# Patient Record
Sex: FEMALE | Race: WHITE | HISPANIC OR LATINO | Employment: FULL TIME | URBAN - METROPOLITAN AREA
[De-identification: names, ages, dates, MRNs, and addresses within clinical notes are randomized per-mention and may not be internally consistent; named-entity substitution may affect disease eponyms.]

---

## 2017-02-03 ENCOUNTER — HOSPITAL ENCOUNTER (EMERGENCY)
Facility: HOSPITAL | Age: 32
Discharge: HOME | End: 2017-02-03
Attending: EMERGENCY MEDICINE

## 2017-02-03 VITALS
HEIGHT: 62 IN | SYSTOLIC BLOOD PRESSURE: 114 MMHG | RESPIRATION RATE: 18 BRPM | OXYGEN SATURATION: 100 % | WEIGHT: 170 LBS | HEART RATE: 84 BPM | BODY MASS INDEX: 31.28 KG/M2 | DIASTOLIC BLOOD PRESSURE: 72 MMHG | TEMPERATURE: 98 F

## 2017-02-03 DIAGNOSIS — N12 PYELONEPHRITIS: Primary | ICD-10-CM

## 2017-02-03 DIAGNOSIS — R50.9 FEVER, UNSPECIFIED FEVER CAUSE: ICD-10-CM

## 2017-02-03 LAB
B-HCG UR QL: NEGATIVE
BACTERIA #/AREA URNS HPF: ABNORMAL /HPF
BILIRUB UR QL STRIP: NEGATIVE
CLARITY UR: ABNORMAL
COLOR UR: YELLOW
CTP QC/QA: YES
FLUAV AG SPEC QL IA: NEGATIVE
FLUBV AG SPEC QL IA: NEGATIVE
GLUCOSE UR QL STRIP: NEGATIVE
HGB UR QL STRIP: ABNORMAL
KETONES UR QL STRIP: ABNORMAL
LEUKOCYTE ESTERASE UR QL STRIP: ABNORMAL
MICROSCOPIC COMMENT: ABNORMAL
NITRITE UR QL STRIP: NEGATIVE
PH UR STRIP: 6 [PH] (ref 5–8)
PROT UR QL STRIP: NEGATIVE
RBC #/AREA URNS HPF: 5 /HPF (ref 0–4)
SP GR UR STRIP: 1.01 (ref 1–1.03)
SPECIMEN SOURCE: NORMAL
SQUAMOUS #/AREA URNS HPF: 5 /HPF
URN SPEC COLLECT METH UR: ABNORMAL
UROBILINOGEN UR STRIP-ACNC: NEGATIVE EU/DL
WBC #/AREA URNS HPF: 40 /HPF (ref 0–5)
WBC CLUMPS URNS QL MICRO: ABNORMAL
YEAST URNS QL MICRO: ABNORMAL

## 2017-02-03 PROCEDURE — 87400 INFLUENZA A/B EACH AG IA: CPT | Mod: 59

## 2017-02-03 PROCEDURE — 81000 URINALYSIS NONAUTO W/SCOPE: CPT

## 2017-02-03 PROCEDURE — 99284 EMERGENCY DEPT VISIT MOD MDM: CPT | Mod: 25

## 2017-02-03 PROCEDURE — 96365 THER/PROPH/DIAG IV INF INIT: CPT

## 2017-02-03 PROCEDURE — 25000003 PHARM REV CODE 250: Performed by: EMERGENCY MEDICINE

## 2017-02-03 PROCEDURE — 96361 HYDRATE IV INFUSION ADD-ON: CPT

## 2017-02-03 PROCEDURE — 81025 URINE PREGNANCY TEST: CPT | Performed by: EMERGENCY MEDICINE

## 2017-02-03 PROCEDURE — 63600175 PHARM REV CODE 636 W HCPCS: Performed by: EMERGENCY MEDICINE

## 2017-02-03 PROCEDURE — 25000003 PHARM REV CODE 250: Performed by: PHYSICIAN ASSISTANT

## 2017-02-03 RX ORDER — CIPROFLOXACIN 500 MG/1
500 TABLET ORAL 2 TIMES DAILY
Qty: 20 TABLET | Refills: 0 | Status: SHIPPED | OUTPATIENT
Start: 2017-02-03 | End: 2017-02-13

## 2017-02-03 RX ORDER — CEPHALEXIN 500 MG/1
500 CAPSULE ORAL 4 TIMES DAILY
Qty: 28 CAPSULE | Refills: 0 | Status: SHIPPED | OUTPATIENT
Start: 2017-02-03 | End: 2017-02-10

## 2017-02-03 RX ORDER — ACETAMINOPHEN 500 MG
1000 TABLET ORAL
Status: COMPLETED | OUTPATIENT
Start: 2017-02-03 | End: 2017-02-03

## 2017-02-03 RX ORDER — IBUPROFEN 600 MG/1
600 TABLET ORAL
Status: COMPLETED | OUTPATIENT
Start: 2017-02-03 | End: 2017-02-03

## 2017-02-03 RX ORDER — ONDANSETRON 4 MG/1
4 TABLET, FILM COATED ORAL EVERY 8 HOURS PRN
Qty: 12 TABLET | Refills: 0 | Status: SHIPPED | OUTPATIENT
Start: 2017-02-03

## 2017-02-03 RX ADMIN — CEFTRIAXONE 2 G: 2 INJECTION, SOLUTION INTRAVENOUS at 09:02

## 2017-02-03 RX ADMIN — IBUPROFEN 600 MG: 600 TABLET, FILM COATED ORAL at 09:02

## 2017-02-03 RX ADMIN — ACETAMINOPHEN 1000 MG: 500 TABLET ORAL at 05:02

## 2017-02-03 RX ADMIN — SODIUM CHLORIDE 1000 ML: 0.9 INJECTION, SOLUTION INTRAVENOUS at 09:02

## 2017-02-03 NOTE — ED AVS SNAPSHOT
[facility]  [place] [address]               Tiffany Dyer Jose   2/3/2017  8:14 PM   ED    Descripción:  Female : 1985   Departamento:  [facility]           Cole Cuidado fue coordinado por:     Provider Role From To    Juan Moreno MD Attending Provider 17 --      Razón de la jean     Fever     Headache     Flank Pain           Diagnósticos de Esta Visita        Comentarios    Pyelonephritis    -  Primario     Fever, unspecified fever cause           ED Disposition     Ninguna           Lista de tareas           Información de seguimiento     Realice un seguimiento con:  Paola Santoyo MD    Cómo:  Elsa margarito jean lo antes posible    Cuándo:  2/10/2017    Especialidad:  Internal Medicine    Por qué:  As needed    Información de contacto:    [address] [place] [address] [place] [address] [phone]        Recetas para recoger        Disp Refills Start End    cephALEXin (KEFLEX) 500 MG capsule 28 capsule 0 2/3/2017 2/10/2017    Take 1 capsule (500 mg total) by mouth 4 (four) times daily. - Oral    ciprofloxacin HCl (CIPRO) 500 MG tablet 20 tablet 0 2/3/2017 2017    Take 1 tablet (500 mg total) by mouth 2 (two) times daily. - Oral    ondansetron (ZOFRAN) 4 MG tablet 12 tablet 0 2/3/2017     Take 1 tablet (4 mg total) by mouth every 8 (eight) hours as needed. - Oral      Ochsescobar en Llamada     Ochjoycelyn En Llamada Línea de Enfermeras - Asistencia   Enfermeras registradas de [facility] pueden ayudarle a reservar margarito jean, proveer educación para la tod, asesoría clínica, y otros servicios de asesoramiento.   Llame para josé manuel servicio gratuito a [phone].             Medicamentos           Mensaje sobre Medicamentos     Verificar los cambios y / o adiciones a cole régimen de medicación son los mismos que discutir con cole médico. Si cualquiera de estos cambios o adiciones son incorrectos, por favor notifique a ocle  proveedor de atención médica.        EMPEZAR a brent estos medicamentos NUEVOS        Refills    cephALEXin (KEFLEX) 500 MG capsule 0    Sig: Take 1 capsule (500 mg total) by mouth 4 (four) times daily.    Categoría: Print    Vía: Oral    ciprofloxacin HCl (CIPRO) 500 MG tablet 0    Sig: Take 1 tablet (500 mg total) by mouth 2 (two) times daily.    Categoría: Print    Vía: Oral    ondansetron (ZOFRAN) 4 MG tablet 0    Sig: Take 1 tablet (4 mg total) by mouth every 8 (eight) hours as needed.    Categoría: Print    Vía: Oral      These medications were administered today        Dose Freq    acetaminophen tablet 1,000 mg 1,000 mg ED 1 Time    Sig: Take 2 tablets (1,000 mg total) by mouth ED 1 Time.    Categoría: Normal    Vía: Oral    ibuprofen tablet 600 mg 600 mg ED 1 Time    Sig: Take 1 tablet (600 mg total) by mouth ED 1 Time.    Categoría: Normal    Vía: Oral    Cofirmante de órdenes: Accepted by Juan Moreno MD on 2/3/2017  7:01 PM    sodium chloride 0.9% bolus 1,000 mL 1,000 mL ED 1 Time    Sig: Inject 1,000 mLs into the vein ED 1 Time.    Categoría: Normal    Vía: Intravenous    cefTRIAXone (ROCEPHIN) 2 g in dextrose 5 % 50 mL IVPB 2 g ED 1 Time    Sig: Inject 50 mLs (2 g total) into the vein ED 1 Time.    Categoría: Normal    Vía: Intravenous           Verifique que la siguiente lista de medicamentos es margarito representación exacta de los medicamentos que está tomando actualmente. Si no hay ningunos reportados, la lista puede estar en cosme. Si no es correcta, por favor póngase en contacto con hurley proveedor de atención médica. Lleve esta lista con usted en abbie de emergencia.           Medicamentos Actuales     cefTRIAXone (ROCEPHIN) 2 g in dextrose 5 % 50 mL IVPB Inject 50 mLs (2 g total) into the vein ED 1 Time.    cephALEXin (KEFLEX) 500 MG capsule Take 1 capsule (500 mg total) by mouth 4 (four) times daily.    ciprofloxacin HCl (CIPRO) 500 MG tablet Take 1 tablet (500 mg total) by mouth 2 (two) times  "daily.    ibuprofen tablet 600 mg Take 1 tablet (600 mg total) by mouth ED 1 Time.    ondansetron (ZOFRAN) 4 MG tablet Take 1 tablet (4 mg total) by mouth every 8 (eight) hours as needed.    sodium chloride 0.9% bolus 1,000 mL Inject 1,000 mLs into the vein ED 1 Time.           Información de referencia clínica           Zora signos vitales amol     PS Pulso Temperatura Resp [place] Pes[place]    120/66 134 98.5 °F (36.9 °C) (Oral) 18 5' 2" (1.575 m) 77.1 kg (170 lb)    SpO2 BMI ([facility])                99% 31.09 kg/m2          Alergias     A partir del:  2/3/2017        No Known Allergies      Vacunas     Administradas en la fecha de la visita:  2/3/2017        None      ED Micro, Lab, POCT     Start Ordered       Status Ordering Provider    02/03/17 1644 02/03/17 1644  Urinalysis  STAT      Final result     02/03/17 1644 02/03/17 1644  POCT urine pregnancy  Once      Final result     02/03/17 1644 02/03/17 1644  Influenza antigen Nasopharyngeal Swab  STAT      Final result     02/03/17 1644 02/03/17 1644  Urinalysis Microscopic  Once      Final result       ED Imaging Orders     None      Referencias/Adjuntos de anne marie     PYELONEPHRITIS, FEMALE (ADULT) (Kazakh)      Registrarse para [facility]     La activación de hurley cuenta [facility] es tan fácil davey 1-2-3!    1) Ir a [email or web address], seleccione Registrarse Ahora, meter el código de activación y hurley fecha de nacimiento, y seleccione Próximo.    [id number]  Expires: 3/20/2017  8:21 PM      2) Crear un nombre de usuario y contraseña para usar cuando se visita [facility] en el futuro y selecciona margarito pregunta de seguridad en abbie de que pierda hurley contraseña y seleccione Próximo.    3) Introduzca hurley dirección de correo electrónico y kelvin clic en Registrarse!    Información Adicional  Si tiene alguna pregunta, por favor, e-mail [email or web address] o llame al [phone] para hablar con nuestro personal. Recuerde, [facility] no debe ser usada para necesidades urgentes. En " abbie de emergencia médica, llame al [phone].         [facility] cumple con las leyes federales aplicables de derechos civiles y no discrimina por motivos de nelda, color, origen nacional, edad, discapacidad, o sexo.        Language Assistance Services     ATTENTION: Language assistance services are available, free of charge. Please call [phone].      ATENCIÓN: Si habla español, tiene a hurley disposición servicios gratuitos de asistencia lingüística. Llame al [phone].     CHÚ Ý: N?u b?n nói Ti?ng Vi?t, có các d?ch v? h? tr? ngôn ng? mi?n phí dành cho b?n. G?i s? [phone].                      [facility]  [place] [address]               Tiffany Luisyonathan Garcia   2/3/2017  8:14 PM   ED    Description:  Female : 1985   Department:  [facility]           Your Care was Coordinated By:     Provider Role From To    Juan Moreno MD Attending Provider 17 --      Reason for Visit     Fever     Headache     Flank Pain           Diagnoses this Visit        Comments    Pyelonephritis    -  Primary     Fever, unspecified fever cause           ED Disposition     None           To Do List           Follow-up Information     Follow up with Paola Santoyo MD. Schedule an appointment as soon as possible for a visit in 1 week.    Specialty:  Internal Medicine    Why:  As needed    Contact information:    [address] [place] [address]  [place] [address] [phone]         These Medications        Disp Refills Start End    cephALEXin (KEFLEX) 500 MG capsule 28 capsule 0 2/3/2017 2/10/2017    Take 1 capsule (500 mg total) by mouth 4 (four) times daily. - Oral    ciprofloxacin HCl (CIPRO) 500 MG tablet 20 tablet 0 2/3/2017 2017    Take 1 tablet (500 mg total) by mouth 2 (two) times daily. - Oral    ondansetron (ZOFRAN) 4 MG tablet 12 tablet 0 2/3/2017     Take 1 tablet (4 mg total) by mouth every 8 (eight) hours as  needed. - Oral      [facility]     [facility] On Call Nurse Care Line - 24/7 Assistance  Registered nurses in the [facility] On Call Center provide clinical advisement, health education, appointment booking, and other advisory services.  Call for this free service at [phone].             Medications           Message regarding Medications     Verify the changes and/or additions to your medication regime listed below are the same as discussed with your clinician today.  If any of these changes or additions are incorrect, please notify your healthcare provider.        START taking these NEW medications        Refills    cephALEXin (KEFLEX) 500 MG capsule 0    Sig: Take 1 capsule (500 mg total) by mouth 4 (four) times daily.    Class: Print    Route: Oral    ciprofloxacin HCl (CIPRO) 500 MG tablet 0    Sig: Take 1 tablet (500 mg total) by mouth 2 (two) times daily.    Class: Print    Route: Oral    ondansetron (ZOFRAN) 4 MG tablet 0    Sig: Take 1 tablet (4 mg total) by mouth every 8 (eight) hours as needed.    Class: Print    Route: Oral      These medications were administered today        Dose Freq    acetaminophen tablet 1,000 mg 1,000 mg ED 1 Time    Sig: Take 2 tablets (1,000 mg total) by mouth ED 1 Time.    Class: Normal    Route: Oral    ibuprofen tablet 600 mg 600 mg ED 1 Time    Sig: Take 1 tablet (600 mg total) by mouth ED 1 Time.    Class: Normal    Route: Oral    Cosign for Ordering: Accepted by Juan Moreno MD on 2/3/2017  7:01 PM    sodium chloride 0.9% bolus 1,000 mL 1,000 mL ED 1 Time    Sig: Inject 1,000 mLs into the vein ED 1 Time.    Class: Normal    Route: Intravenous    cefTRIAXone (ROCEPHIN) 2 g in dextrose 5 % 50 mL IVPB 2 g ED 1 Time    Sig: Inject 50 mLs (2 g total) into the vein ED 1 Time.    Class: Normal    Route: Intravenous           Verify that the below list of medications is an accurate representation of the medications you are currently taking.  If none reported, the list  "may be blank. If incorrect, please contact your healthcare provider. Carry this list with you in case of emergency.           Current Medications     cefTRIAXone (ROCEPHIN) 2 g in dextrose 5 % 50 mL IVPB Inject 50 mLs (2 g total) into the vein ED 1 Time.    cephALEXin (KEFLEX) 500 MG capsule Take 1 capsule (500 mg total) by mouth 4 (four) times daily.    ciprofloxacin HCl (CIPRO) 500 MG tablet Take 1 tablet (500 mg total) by mouth 2 (two) times daily.    ibuprofen tablet 600 mg Take 1 tablet (600 mg total) by mouth ED 1 Time.    ondansetron (ZOFRAN) 4 MG tablet Take 1 tablet (4 mg total) by mouth every 8 (eight) hours as needed.    sodium chloride 0.9% bolus 1,000 mL Inject 1,000 mLs into the vein ED 1 Time.           Clinical Reference Information           Your Vitals Were     BP Pulse Temp Resp Height Weight    120/66 134 98.5 °F (36.9 °C) (Oral) 18 5' 2" (1.575 m) 77.1 kg (170 lb)    SpO2 BMI                99% 31.09 kg/m2          Allergies as of 2/3/2017     No Known Allergies      Immunizations Administered on Date of Encounter - 2/3/2017     None      ED Micro, Lab, POCT     Start Ordered       Status Ordering Provider    02/03/17 1644 02/03/17 1644  Urinalysis  STAT      Final result     02/03/17 1644 02/03/17 1644  POCT urine pregnancy  Once      Final result     02/03/17 1644 02/03/17 1644  Influenza antigen Nasopharyngeal Swab  STAT      Final result     02/03/17 1644 02/03/17 1644  Urinalysis Microscopic  Once      Final result       ED Imaging Orders     None      Discharge References/Attachments     PYELONEPHRITIS, FEMALE (ADULT) (Welsh)      [facility] Sign-Up     Activating your [facility] account is as easy as 1-2-3!     1) Visit [email or web address], select Sign Up Now, enter this activation code and your date of birth, then select Next.  [email or web address]  Expires: 3/20/2017  8:21 PM      2) Create a username and password to use when you visit [facility] in the future and select a security question in " case you lose your password and select Next.    3) Enter your e-mail address and click Sign Up!    Additional Information  If you have questions, please e-mail [email or web address] or call [phone] to talk to our [facility] staff. Remember, [facility] is NOT to be used for urgent needs. For medical emergencies, dial 911.          [facility] complies with applicable Federal civil rights laws and does not discriminate on the basis of race, color, national origin, age, disability, or sex.        Language Assistance Services     ATTENTION: Language assistance services are available, free of charge. Please call [phone].      ATENCIÓN: Si habla rajinderañol, tiene a hurley disposición servicios gratuitos de asistencia lingüística. Llame al [phone].     CHÚ Ý: N?u b?n nói Ti?ng Vi?t, có các d?ch v? h? tr? ngôn ng? mi?n phí dành cho b?n. G?i s? [phone].

## 2017-02-04 NOTE — ED PROVIDER NOTES
Encounter Date: 2/3/2017    SCRIBE #1 NOTE: I, Keely Evans, am scribing for, and in the presence of,  Juan Moreno MD. I have scribed the following portions of the note - Other sections scribed: HPI and ROS.       History     Chief Complaint   Patient presents with    Fever     pt states her symptoms started 4 days ago.  The flank pain is more of muscle spasms.     Headache    Flank Pain     Review of patient's allergies indicates:  No Known Allergies  HPI Comments: CC: Fever    HPI: This 31 y.o. female with medical history of presents to the ED c/o a subjective fever for the past 3 days.. Pt reports that she is also experiencing generalized body aches, headache and decreased appetite. She also c/o left flank pain (near the left abdomen). Symptoms are acute, constant and severe (8/10). No other associated symptoms. No alleviating factors.         The history is provided by the patient. The history is limited by a language barrier (Pt is Bulgarian speaking). A  was used ([employer] used for translating).     History reviewed. No pertinent past medical history.  No past medical history pertinent negatives.  History reviewed. No pertinent past surgical history.  History reviewed. No pertinent family history.  Social History   Substance Use Topics    Smoking status: Never Smoker    Smokeless tobacco: None    Alcohol use 0.6 oz/week     1 Cans of beer per week      Comment: occasionally     Review of Systems   Constitutional: Positive for appetite change (decreased) and fever (subjective).   HENT: Negative for sore throat.    Respiratory: Negative for shortness of breath.    Cardiovascular: Negative for chest pain.   Gastrointestinal: Negative for nausea.   Genitourinary: Positive for flank pain (left; near the left abdomen). Negative for dysuria.   Musculoskeletal: Negative for back pain.        (+) generalized body aches   Skin: Negative for rash.   Neurological: Positive for headaches.  Negative for weakness.       Physical Exam   Initial Vitals   BP Pulse Resp Temp SpO2   02/03/17 1641 02/03/17 1641 02/03/17 1641 02/03/17 1641 02/03/17 1641   120/66 134 18 103.2 °F (39.6 °C) 99 %     Physical Exam    Nursing note and vitals reviewed.  Constitutional: She appears well-developed and well-nourished.   HENT:   Head: Normocephalic and atraumatic.   Eyes: EOM are normal. Pupils are equal, round, and reactive to light.   Cardiovascular: Normal rate, regular rhythm, normal heart sounds and intact distal pulses.   Pulmonary/Chest: Breath sounds normal. No respiratory distress. She has no wheezes. She has no rhonchi. She has no rales. She exhibits no tenderness.   Abdominal: Soft. Bowel sounds are normal. She exhibits no distension. There is tenderness (left flank). There is no rebound and no guarding.   Musculoskeletal: Normal range of motion. She exhibits no edema or tenderness.   Neurological: She is alert and oriented to person, place, and time. She has normal strength.   Skin: Skin is warm and dry.   Psychiatric: She has a normal mood and affect.         ED Course   Procedures  Labs Reviewed   URINALYSIS - Abnormal; Notable for the following:        Result Value    Appearance, UA Hazy (*)     Ketones, UA Trace (*)     Occult Blood UA 3+ (*)     Leukocytes, UA 2+ (*)     All other components within normal limits   URINALYSIS MICROSCOPIC - Abnormal; Notable for the following:     RBC, UA 5 (*)     WBC, UA 40 (*)     WBC Clumps, UA Occasional (*)     Bacteria, UA Few (*)     All other components within normal limits   INFLUENZA A AND B ANTIGEN   POCT URINE PREGNANCY            MEDICAL DECISION MAKING    This is an emergent evaluation of the patient's symptoms.  Differential diagnoses includes: Influenza, pyelonephritis, meningitis, viral illness. Room air pulse oximetry interpreted by me as normal. A decision was made to obtain the patient's medical records.  Records were not immediately available for  review. Patient was initially noted to have fever of 103.2 with associated tachycardia.  This was treated with Tylenol and Motrin. Urinalysis consistent with acute infection.  Findings are consistent with acute pyelonephritis.  Patient appears well without evidence to suggest sepsis.  Treatment with Rocephin, IV fluids.  Discharge with Keflex, Cipro, Zofran.  Outpatient follow-up.              Scribe Attestation:   Scribe #1: I performed the above scribed service and the documentation accurately describes the services I performed. I attest to the accuracy of the note.    Attending Attestation:           Physician Attestation for Scribe:  Physician Attestation Statement for Scribe #1: I, Juan Moreno MD, reviewed documentation, as scribed by Keely Evans in my presence, and it is both accurate and complete.                 ED Course     Clinical Impression:   The primary encounter diagnosis was Pyelonephritis. A diagnosis of Fever, unspecified fever cause was also pertinent to this visit.          Juan Moreno MD  02/03/17 3689

## 2025-07-01 ENCOUNTER — HOSPITAL ENCOUNTER (EMERGENCY)
Facility: HOSPITAL | Age: 40
Discharge: HOME | End: 2025-07-01
Attending: EMERGENCY MEDICINE

## 2025-07-01 VITALS
DIASTOLIC BLOOD PRESSURE: 64 MMHG | SYSTOLIC BLOOD PRESSURE: 99 MMHG | HEART RATE: 73 BPM | TEMPERATURE: 99 F | RESPIRATION RATE: 18 BRPM | OXYGEN SATURATION: 98 %

## 2025-07-01 DIAGNOSIS — S16.1XXA STRAIN OF NECK MUSCLE, INITIAL ENCOUNTER: ICD-10-CM
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
## 2025-07-01 LAB
B-HCG UR QL: NEGATIVE
CTP QC/QA: YES

## 2025-07-01 PROCEDURE — 96375 TX/PRO/DX INJ NEW DRUG ADDON: CPT

## 2025-07-01 PROCEDURE — 96374 THER/PROPH/DIAG INJ IV PUSH: CPT

## 2025-07-01 PROCEDURE — 63600175 PHARM REV CODE 636 W HCPCS: Performed by: EMERGENCY MEDICINE

## 2025-07-01 PROCEDURE — 81025 URINE PREGNANCY TEST: CPT | Performed by: EMERGENCY MEDICINE

## 2025-07-01 PROCEDURE — 25000003 PHARM REV CODE 250: Performed by: EMERGENCY MEDICINE

## 2025-07-01 PROCEDURE — 99285 EMERGENCY DEPT VISIT HI MDM: CPT | Mod: 25

## 2025-07-01 RX ORDER — CYCLOBENZAPRINE HCL 10 MG
10 TABLET ORAL 3 TIMES DAILY PRN
Qty: 15 TABLET | Refills: 0 | Status: SHIPPED | OUTPATIENT
Start: 2025-07-01 | End: 2025-07-06

## 2025-07-01 RX ORDER — ACETAMINOPHEN 500 MG
500 TABLET ORAL EVERY 6 HOURS PRN
Qty: 13 TABLET | Refills: 0 | Status: SHIPPED | OUTPATIENT
Start: 2025-07-01

## 2025-07-01 RX ORDER — MORPHINE SULFATE 4 MG/ML
6 INJECTION, SOLUTION INTRAMUSCULAR; INTRAVENOUS
Refills: 0 | Status: COMPLETED | OUTPATIENT
Start: 2025-07-01 | End: 2025-07-01

## 2025-07-01 RX ORDER — OXYCODONE AND ACETAMINOPHEN 5; 325 MG/1; MG/1
1 TABLET ORAL
Refills: 0 | Status: COMPLETED | OUTPATIENT
Start: 2025-07-01 | End: 2025-07-01

## 2025-07-01 RX ORDER — ONDANSETRON HYDROCHLORIDE 2 MG/ML
4 INJECTION, SOLUTION INTRAVENOUS
Status: COMPLETED | OUTPATIENT
Start: 2025-07-01 | End: 2025-07-01

## 2025-07-01 RX ADMIN — OXYCODONE HYDROCHLORIDE AND ACETAMINOPHEN 1 TABLET: 5; 325 TABLET ORAL at 05:07

## 2025-07-01 RX ADMIN — ONDANSETRON 4 MG: 2 INJECTION INTRAMUSCULAR; INTRAVENOUS at 04:07

## 2025-07-01 RX ADMIN — MORPHINE SULFATE 6 MG: 4 INJECTION INTRAVENOUS at 04:07

## 2025-07-01 NOTE — Clinical Note
"Tiffany Bailey "Milana Garcia was seen and treated in our emergency department on 7/1/2025.  She may return to work on 07/03/2025.       If you have any questions or concerns, please don't hesitate to call.      Kai Diaz MD"

## 2025-07-01 NOTE — DISCHARGE INSTRUCTIONS
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
